# Patient Record
Sex: FEMALE | Race: WHITE | NOT HISPANIC OR LATINO | Employment: UNEMPLOYED | URBAN - METROPOLITAN AREA
[De-identification: names, ages, dates, MRNs, and addresses within clinical notes are randomized per-mention and may not be internally consistent; named-entity substitution may affect disease eponyms.]

---

## 2017-01-30 ENCOUNTER — APPOINTMENT (OUTPATIENT)
Dept: RADIOLOGY | Facility: MEDICAL CENTER | Age: 32
End: 2017-01-30
Attending: NEUROLOGICAL SURGERY
Payer: MEDICARE

## 2017-02-01 ENCOUNTER — APPOINTMENT (OUTPATIENT)
Dept: RADIOLOGY | Facility: MEDICAL CENTER | Age: 32
End: 2017-02-01
Attending: NEUROLOGICAL SURGERY
Payer: MEDICARE

## 2017-02-01 DIAGNOSIS — M54.12 RADICULOPATHY, CERVICAL: ICD-10-CM

## 2017-02-01 PROCEDURE — 72141 MRI NECK SPINE W/O DYE: CPT

## 2017-02-09 ENCOUNTER — NON-PROVIDER VISIT (OUTPATIENT)
Dept: NEUROLOGY | Facility: MEDICAL CENTER | Age: 32
End: 2017-02-09
Payer: MEDICARE

## 2017-02-09 DIAGNOSIS — R20.0 NUMBNESS: ICD-10-CM

## 2017-02-09 DIAGNOSIS — M79.602 PAIN IN BOTH UPPER EXTREMITIES: ICD-10-CM

## 2017-02-09 DIAGNOSIS — M79.601 PAIN IN BOTH UPPER EXTREMITIES: ICD-10-CM

## 2017-02-09 PROCEDURE — 95910 NRV CNDJ TEST 7-8 STUDIES: CPT | Performed by: PSYCHIATRY & NEUROLOGY

## 2017-02-09 PROCEDURE — 95886 MUSC TEST DONE W/N TEST COMP: CPT | Performed by: PSYCHIATRY & NEUROLOGY

## 2017-02-09 NOTE — MR AVS SNAPSHOT
Luann Boss   2017 1:45 PM   Appointment   MRN: [id number]    Department:  [facility]   Dept Phone:  [phone]    Description:  Female : 1985   Provider:  [facility]           Allergies as of 2017     Not on File      Basic Information     Date Of Birth Sex Race Ethnicity Preferred Language    1985 Female White Non- English      Health Maintenance     Patient has no pending health maintenance at this time      Current Immunizations     No immunizations on file.      Below and/or attached are the medications your provider expects you to take. Review all of your home medications and newly ordered medications with your provider and/or pharmacist. Follow medication instructions as directed by your provider and/or pharmacist. Please keep your medication list with you and share with your provider. Update the information when medications are discontinued, doses are changed, or new medications (including over-the-counter products) are added; and carry medication information at all times in the event of emergency situations     Allergies:  (Not on file)          Medications  Valid as of: 2017 -  2:25 PM    Generic Name Brand Name Tablet Size Instructions for use    .                 Medicines prescribed today were sent to:     None      Medication refill instructions:       If your prescription bottle indicates you have medication refills left, it is not necessary to call your provider’s office. Please contact your pharmacy and they will refill your medication.    If your prescription bottle indicates you do not have any refills left, you may request refills at any time through one of the following ways: The online Wearable Security system (except Urgent Care), by calling your provider’s office, or by asking your pharmacy to contact your provider’s office with a refill request. Medication refills are processed only during regular business hours and may not be  available until the next business day. Your provider may request additional information or to have a follow-up visit with you prior to refilling your medication.   *Please Note: Medication refills are assigned a new Rx number when refilled electronically. Your pharmacy may indicate that no refills were authorized even though a new prescription for the same medication is available at the pharmacy. Please request the medicine by name with the pharmacy before contacting your provider for a refill.           Caktus Access Code: [email or web address]  Expires: 3/11/2017  1:24 PM    Your email address is not on file at [facility].  Email Addresses are required for you to sign up for Caktus, please contact [phone] to verify your personal information and to provide your email address prior to attempting to register for Caktus.    Luann Boss  [address] [place] [address]    Caktus  A secure, online tool to manage your health information     [facility]’s Caktus® is a secure, online tool that connects you to your personalized health information from the privacy of your home -- day or night - making it very easy for you to manage your healthcare. Once the activation process is completed, you can even access your medical information using the Caktus rebel, which is available for free in the Apple Rebel store or Google Play store.     To learn more about Caktus, visit [email or web address]    There are two levels of access available (as shown below):   My Chart Features  [facility] Primary Care Doctor [facility]  Specialists [facility]  Urgent  Care Non-[facility] Primary Care Doctor   Email your healthcare team securely and privately 24/7 X X X    Manage appointments: schedule your next appointment; view details of past/upcoming appointments X      Request prescription refills. X      View recent personal medical records, including lab and immunizations X X X X   View health record, including health history, allergies,  medications X X X X   Read reports about your outpatient visits, procedures, consult and ER notes X X X X   See your discharge summary, which is a recap of your hospital and/or ER visit that includes your diagnosis, lab results, and care plan X X  X     How to register for Bundle:  Once your e-mail address has been verified, follow the following steps to sign up for Bundle.     1. Go to  [email or web address]  2. Click on the Sign Up Now box, which takes you to the New Member Sign Up page. You will need to provide the following information:  a. Enter your Bundle Access Code exactly as it appears at the top of this page. (You will not need to use this code after you’ve completed the sign-up process. If you do not sign up before the expiration date, you must request a new code.)   b. Enter your date of birth.   c. Enter your home email address.   d. Click Submit, and follow the next screen’s instructions.  3. Create a Mowjowt ID. This will be your Bundle login ID and cannot be changed, so think of one that is secure and easy to remember.  4. Create a Bundle password. You can change your password at any time.  5. Enter your Password Reset Question and Answer. This can be used at a later time if you forget your password.   6. Enter your e-mail address. This allows you to receive e-mail notifications when new information is available in Bundle.  7. Click Sign Up. You can now view your health information.    For assistance activating your Bundle account, call [phone]

## 2017-02-10 NOTE — PROCEDURES
DATE OF SERVICE:  02/09/2017    The patient has tingling off and on over the limbs in the past 1+ years.  The   patient also has psoriasis.  The patient has spine degeneration and   status post epidural injection in her neck and back.  This tingling and   numbness come and go and could last for hours or so, more in the left limbs,   but also in the right limbs as well.    [facility]  Neurodiagnostic Laboratory  [facility] [place] [address] [phone]      Patient: TATY Shafer MD: MARIANNE  Medical Record: [id number] Abel MD: LOUISA  YOB: 1985 Indication: G54.2  Age: 31 Years 7 Months  Notes: [facility]#[id number]      Sensory NCS      Nerve / Sites Rec. Site Onset Lat Peak Lat NP Amp PP Amp Segments Distance Velocity     ms ms µV µV  cm m/s   L MEDIAN - Dig II-PENG      Wrist Dig II 2.30 3.10 83.2 116.8 Wrist - Dig II 13 56.5   L ULNAR - Dig V -PENG      Wrist Dig V 1.80 2.60 43.3 287.1 Wrist - Dig V 11 61.1   L MEDIAN - Ulnar DigIV-PENG      Median Dig IV 2.30 3.00 37.9 53.9 Median - Dig IV 13 56.5      Ulnar Dig IV 2.20 3.00 45.0 31.5 Ulnar - Dig IV 13 59.1   L SURAL - Lat Mall-PENG      Calf Lat Mall 3.05 3.95 23.0 6.4 Calf - Lat Mall 13 42.6               Motor NCS      Nerve / Sites Rec. Site Lat Amp Rel Amp Segments Dist. Song     ms mV %  cm m/s   L MEDIAN - APB -PENG      Wrist APB 3.55 12.0 100 Wrist - APB 7       Elbow APB 7.40 12.1 101 Elbow - Wrist 24 62.3   L ULNAR - ADM -PENG      Wrist ADM 2.25 14.1 100 Wrist - ADM 7       B.Elbow ADM 5.55 13.8 98.4 B.Elbow - Wrist 24 72.7   L COMM PERONEAL - EDB-PENG      Ankle EDB 3.80 8.3 100 Ankle - EDB 9       Knee EDB 9.90 8.1 97.8 Knee - Ankle 31 50.8   L TIBIAL (KNEE) - EHB-PENG      Ankle EHB 3.25 23.2 100 Ankle - EHB 9       Knee EHB 10.90 19.9 85.8 Knee - Ankle 38 49.7               EMG Summary Table     Spontaneous MUAP Recruitment    IA Fib PSW Fasc H.F. Amp Dur. PPP Pattern   L. VAST LATERALIS N None None None  None N N N N   L. VAST MEDIALIS N None None None None N N N N   L. TIB ANTERIOR N None None None None N N N N   L. GASTROCN (MED) N None None None None N N N N   L. EXT DIG BREVIS N None None None None N N N N       SUMMARY:    The NCV/EMG tests are within normal limit.    IMPRESSION:     The NCV/EMG tests are within normal limit.    Advised further workup by such as blood test , EEG test and outpatient neurologist follow up      Nerve conduction studies and EMG studies are useful supportive diagnostic tool but not confirmatory tests in majority of cases. Clinical Correlation is advised     ____________________________________     MD CHERELLE OLSEN / LUIS    DD:  02/09/2017 15:37:34  DT:  02/09/2017 17:02:06    D#:  [id number]  Job#:  [id number]